# Patient Record
Sex: FEMALE | Race: BLACK OR AFRICAN AMERICAN | Employment: FULL TIME | URBAN - METROPOLITAN AREA
[De-identification: names, ages, dates, MRNs, and addresses within clinical notes are randomized per-mention and may not be internally consistent; named-entity substitution may affect disease eponyms.]

---

## 2021-12-01 ENCOUNTER — HOSPITAL ENCOUNTER (EMERGENCY)
Age: 36
Discharge: HOME | End: 2021-12-01
Attending: EMERGENCY MEDICINE
Payer: OTHER GOVERNMENT

## 2021-12-01 VITALS
WEIGHT: 226 LBS | TEMPERATURE: 97.7 F | HEIGHT: 66 IN | SYSTOLIC BLOOD PRESSURE: 120 MMHG | HEART RATE: 88 BPM | RESPIRATION RATE: 16 BRPM | BODY MASS INDEX: 36.32 KG/M2 | OXYGEN SATURATION: 96 % | DIASTOLIC BLOOD PRESSURE: 74 MMHG
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
## 2021-12-01 DIAGNOSIS — S39.012A BACK STRAIN, INITIAL ENCOUNTER: ICD-10-CM

## 2021-12-01 DIAGNOSIS — S16.1XXA STRAIN OF NECK MUSCLE, INITIAL ENCOUNTER: ICD-10-CM

## 2021-12-01 PROCEDURE — 99282 EMERGENCY DEPT VISIT SF MDM: CPT

## 2021-12-01 RX ORDER — IBUPROFEN 800 MG/1
800 TABLET ORAL
Qty: 20 TABLET | Refills: 0 | Status: SHIPPED | OUTPATIENT
Start: 2021-12-01 | End: 2021-12-08

## 2021-12-01 RX ORDER — CYCLOBENZAPRINE HCL 10 MG
10 TABLET ORAL
Qty: 10 TABLET | Refills: 0 | Status: SHIPPED | OUTPATIENT
Start: 2021-12-01
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
## 2021-12-01 NOTE — Clinical Note
[facility] [place]  [facility]  [address]  [facility] [address] [phone]    Work/School Note    Date: 12/1/2021    To Whom It May concern:    Mary Prasad was seen and treated today in the emergency room by the following provider(s):  Attending Provider: Natalie Bee MD  Physician Assistant: MENDEL Gutiérrez. Mary Prasad is excused from work/school on 12/01/21 and 12/02/21. She is medically clear to return to work/school on 12/3/2021.        Sincerely,          Edgard Arora RN

## 2021-12-01 NOTE — Clinical Note
[facility] [place]  [facility]  [address]  [facility] [address]  [address] [phone]    Work/School Note    Date: 12/1/2021    To Whom It May concern:    Lizett Mancera was seen and treated today in the emergency room by the following provider(s):  Attending Provider: Mckenna Goss MD  Physician Assistant: MENDEL Rollins. Lizett Mancera is excused from work/school on 12/01/21 and 12/02/21. She is medically clear to return to work/school on 12/3/2021.        Sincerely,          MENDEL Celeste
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
## 2023-03-28 ENCOUNTER — HOSPITAL ENCOUNTER (OUTPATIENT)
Facility: HOSPITAL | Age: 38
Discharge: HOME | End: 2023-03-31
Payer: OTHER GOVERNMENT

## 2023-03-28 LAB
HCT VFR BLD AUTO: 38.7 % (ref 35–45)
HGB BLD-MCNC: 12.4 G/DL (ref 12–16)

## 2023-03-28 PROCEDURE — 85018 HEMOGLOBIN: CPT

## 2023-03-28 PROCEDURE — 36415 COLL VENOUS BLD VENIPUNCTURE: CPT

## 2023-05-15 RX ORDER — CYCLOBENZAPRINE HCL 10 MG
10 TABLET ORAL 3 TIMES DAILY PRN
COMMUNITY
Start: 2021-12-01

## 2023-09-25 ENCOUNTER — HOSPITAL ENCOUNTER (EMERGENCY)
Facility: HOSPITAL | Age: 38
Discharge: HOME | End: 2023-09-25
Payer: OTHER GOVERNMENT

## 2023-09-25 ENCOUNTER — APPOINTMENT (OUTPATIENT)
Facility: HOSPITAL | Age: 38
End: 2023-09-25
Payer: OTHER GOVERNMENT

## 2023-09-25 VITALS
BODY MASS INDEX: 36.96 KG/M2 | HEART RATE: 75 BPM | RESPIRATION RATE: 16 BRPM | OXYGEN SATURATION: 100 % | DIASTOLIC BLOOD PRESSURE: 79 MMHG | SYSTOLIC BLOOD PRESSURE: 144 MMHG | TEMPERATURE: 97.3 F | HEIGHT: 66 IN | WEIGHT: 230 LBS

## 2023-09-25 DIAGNOSIS — L03.011 PARONYCHIA OF FINGER OF RIGHT HAND: Primary | ICD-10-CM

## 2023-09-25 PROCEDURE — 99283 EMERGENCY DEPT VISIT LOW MDM: CPT

## 2023-09-25 PROCEDURE — 73140 X-RAY EXAM OF FINGER(S): CPT

## 2023-09-25 RX ORDER — DOXYCYCLINE HYCLATE 100 MG
100 TABLET ORAL 2 TIMES DAILY
Qty: 20 TABLET | Refills: 0 | Status: SHIPPED | OUTPATIENT
Start: 2023-09-25 | End: 2023-10-05

## 2023-09-25 RX ORDER — LAMOTRIGINE 100 MG/1
100 TABLET ORAL DAILY
COMMUNITY

## 2023-09-25 ASSESSMENT — PAIN DESCRIPTION - DESCRIPTORS: DESCRIPTORS: THROBBING

## 2023-09-25 ASSESSMENT — PAIN SCALES - GENERAL: PAINLEVEL_OUTOF10: 8

## 2023-09-25 ASSESSMENT — PAIN DESCRIPTION - ORIENTATION: ORIENTATION: RIGHT

## 2023-09-25 ASSESSMENT — PAIN - FUNCTIONAL ASSESSMENT: PAIN_FUNCTIONAL_ASSESSMENT: 0-10

## 2023-09-25 ASSESSMENT — PAIN DESCRIPTION - LOCATION: LOCATION: FINGER (COMMENT WHICH ONE)

## 2023-09-25 NOTE — ED PROVIDER NOTES
medical records. Nursing notes. ED COURSE       Medial Decision Making:  DDX: Paronychia, abscess, cellulitis, foreign body    41 y.o. female  In evaluation of the above differential diagnosis, consideration was given to the following tests and treatments: X-ray of the right third finger which shows no acute abnormalities. Patient had obvious paronychia that had developed around and underneath her nail that apparently drained as she was having her acrylic nail removed at the salon prior to arrival.  There is no sign of felon or tenosynovitis. Recommend soaks and antibiotics for presumed MRSA as she is not a nail biter and return to ED if any worsening or changes. I discussed each of these tests and considerations with the patient. They agree with the plan of discharge. FINAL IMPRESSION     1. Paronychia of finger of right hand            DISPOSITION/PLAN   DISPOSITION Decision To Discharge 09/25/2023 03:50:26 PM           PATIENT REFERRED TO:  James Snowden MD  [address] [id number]      As needed    [facility]  [facility] [address]  [phone]    As needed, If symptoms worsen         DISCHARGE MEDICATIONS:     Medication List        START taking these medications      doxycycline hyclate 100 MG tablet  Commonly known as: VIBRA-TABS  Take 1 tablet by mouth 2 times daily for 10 days            ASK your doctor about these medications      cyclobenzaprine 10 MG tablet  Commonly known as: FLEXERIL     lamoTRIgine 100 MG tablet  Commonly known as: LAMICTAL               Where to Get Your Medications        These medications were sent to [address] [address]      Phone: [phone]   doxycycline hyclate 100 MG tablet            I am the Primary Clinician of Record.        (Please note that parts of this

## 2023-09-25 NOTE — ED TRIAGE NOTES
Patient reports she bumped her 3rd finger on right hand and it broke off gel nail states her nail is splt